# Patient Record
Sex: MALE | Race: OTHER | NOT HISPANIC OR LATINO | URBAN - METROPOLITAN AREA
[De-identification: names, ages, dates, MRNs, and addresses within clinical notes are randomized per-mention and may not be internally consistent; named-entity substitution may affect disease eponyms.]

---

## 2022-06-27 ENCOUNTER — EMERGENCY (EMERGENCY)
Facility: HOSPITAL | Age: 27
LOS: 1 days | Discharge: HOME | End: 2022-06-27
Attending: EMERGENCY MEDICINE | Admitting: EMERGENCY MEDICINE
Payer: MEDICAID

## 2022-06-27 VITALS
TEMPERATURE: 98 F | OXYGEN SATURATION: 100 % | HEART RATE: 100 BPM | DIASTOLIC BLOOD PRESSURE: 87 MMHG | RESPIRATION RATE: 17 BRPM | SYSTOLIC BLOOD PRESSURE: 144 MMHG

## 2022-06-27 PROCEDURE — 99284 EMERGENCY DEPT VISIT MOD MDM: CPT

## 2022-06-27 PROCEDURE — 73630 X-RAY EXAM OF FOOT: CPT | Mod: 26,LT

## 2022-06-27 PROCEDURE — 73610 X-RAY EXAM OF ANKLE: CPT | Mod: 26,LT

## 2022-06-27 PROCEDURE — 73590 X-RAY EXAM OF LOWER LEG: CPT | Mod: 26,LT

## 2022-06-27 NOTE — ED PROVIDER NOTE - NSFOLLOWUPINSTRUCTIONS_ED_ALL_ED_FT
Rest.    Ice to affected area for 15 minutes few times a day.    Use wrap and splint at all times (except bathing) until symptoms improve.    Elevate the ankle (higher than the heart) as much as possible.    Return to ED if any worsening symptoms or numbness, tingling, or other concerns.    Ibuprofen 600mg every 6 to 8 hours with food as needed for pain.

## 2022-06-27 NOTE — ED ADULT TRIAGE NOTE - CHIEF COMPLAINT QUOTE
Pt. c/o  twisting his left ankle stepping down unto a walkway, sent by pcp for x-ray. Unable to weight bear. Pt able to feel sensation. Endorses some numbness. Denies  tingling. Color wnl. pedal pulse present. No PMHx.

## 2022-06-27 NOTE — ED PROVIDER NOTE - PATIENT PORTAL LINK FT
You can access the FollowMyHealth Patient Portal offered by [facility] by registering at the following website: [email or web address]. By joining [facility]’s FollowMyHealth portal, you will also be able to view your health information using other applications (apps) compatible with our system.

## 2022-06-27 NOTE — ED PROVIDER NOTE - OBJECTIVE STATEMENT
Pt is a 28 y/o Male with no PMH p/w left ankle pain s/p injury starting 2 pm today. Pain radiates from lateral ankle toward the knee. Denies numbness and tingling. Denies head trauma, weight bearing since injury, and pain medication prior to arrival. No Hx of ankle fracture.     Of note, Pt twisted his ankle on an uneven pavement when stepping out of the car to  his children from school. States ankle was inverted when stepped on the pavement.

## 2022-06-27 NOTE — ED PROVIDER NOTE - LOWER EXTREMITY EXAM, LEFT
No tenderness of knees; Full ROM of knee. No tenderness of tibia-fibula tips. Significant tenderness noted on both malleolus, lateral greater than medial. No foot deformity./LIMITED ROM/SWELLING/TENDERNESS

## 2022-06-27 NOTE — ED PROVIDER NOTE - CLINICAL SUMMARY MEDICAL DECISION MAKING FREE TEXT BOX
Pt is a 26 y/o Male with no PMH p/w left ankle pain s/p injury starting 2 pm today. Left ankle pain concerning for Fracture vs. Sprain. Will x-ray.  Management of splint vs. cast. Will provide clutches to assist ambulation. Will schedule outpatient follow-up.